# Patient Record
Sex: MALE | Race: WHITE | NOT HISPANIC OR LATINO | URBAN - METROPOLITAN AREA
[De-identification: names, ages, dates, MRNs, and addresses within clinical notes are randomized per-mention and may not be internally consistent; named-entity substitution may affect disease eponyms.]

---

## 2017-05-08 ENCOUNTER — EMERGENCY (EMERGENCY)
Facility: HOSPITAL | Age: 42
LOS: 1 days | End: 2017-05-08
Payer: MEDICARE

## 2017-05-08 PROCEDURE — 99283 EMERGENCY DEPT VISIT LOW MDM: CPT | Mod: 25

## 2017-09-29 ENCOUNTER — EMERGENCY (EMERGENCY)
Facility: HOSPITAL | Age: 42
LOS: 1 days | End: 2017-09-29
Payer: MEDICARE

## 2017-09-29 ENCOUNTER — OUTPATIENT (OUTPATIENT)
Dept: OUTPATIENT SERVICES | Facility: HOSPITAL | Age: 42
LOS: 1 days | End: 2017-09-29

## 2017-09-29 PROCEDURE — 99284 EMERGENCY DEPT VISIT MOD MDM: CPT

## 2018-08-25 ENCOUNTER — EMERGENCY (EMERGENCY)
Facility: HOSPITAL | Age: 43
LOS: 1 days | End: 2018-08-25
Payer: MEDICARE

## 2018-08-25 PROCEDURE — 99283 EMERGENCY DEPT VISIT LOW MDM: CPT

## 2018-09-03 ENCOUNTER — EMERGENCY (EMERGENCY)
Facility: HOSPITAL | Age: 43
LOS: 1 days | End: 2018-09-03
Payer: MEDICARE

## 2018-09-03 PROCEDURE — 99284 EMERGENCY DEPT VISIT MOD MDM: CPT

## 2018-09-03 PROCEDURE — 73660 X-RAY EXAM OF TOE(S): CPT | Mod: 26,RT

## 2018-09-29 ENCOUNTER — EMERGENCY (EMERGENCY)
Facility: HOSPITAL | Age: 43
LOS: 1 days | End: 2018-09-29

## 2019-06-01 ENCOUNTER — OUTPATIENT (OUTPATIENT)
Dept: OUTPATIENT SERVICES | Facility: HOSPITAL | Age: 44
LOS: 1 days | End: 2019-06-01
Payer: MEDICARE

## 2019-06-01 PROCEDURE — G9001: CPT

## 2019-06-16 ENCOUNTER — EMERGENCY (EMERGENCY)
Facility: HOSPITAL | Age: 44
LOS: 1 days | End: 2019-06-16
Admitting: EMERGENCY MEDICINE

## 2019-06-24 DIAGNOSIS — Z71.89 OTHER SPECIFIED COUNSELING: ICD-10-CM

## 2019-07-11 ENCOUNTER — OUTPATIENT (OUTPATIENT)
Dept: OUTPATIENT SERVICES | Facility: HOSPITAL | Age: 44
LOS: 1 days | End: 2019-07-11

## 2019-11-25 ENCOUNTER — EMERGENCY (EMERGENCY)
Facility: HOSPITAL | Age: 44
LOS: 1 days | End: 2019-11-25
Admitting: EMERGENCY MEDICINE
Payer: MEDICARE

## 2019-11-25 PROCEDURE — 73630 X-RAY EXAM OF FOOT: CPT | Mod: 26,LT

## 2019-11-25 PROCEDURE — 99284 EMERGENCY DEPT VISIT MOD MDM: CPT

## 2019-11-28 ENCOUNTER — EMERGENCY (EMERGENCY)
Facility: HOSPITAL | Age: 44
LOS: 1 days | End: 2019-11-28
Admitting: EMERGENCY MEDICINE
Payer: MEDICARE

## 2019-11-28 PROCEDURE — 99283 EMERGENCY DEPT VISIT LOW MDM: CPT

## 2019-12-01 ENCOUNTER — OUTPATIENT (OUTPATIENT)
Dept: OUTPATIENT SERVICES | Facility: HOSPITAL | Age: 44
LOS: 1 days | End: 2019-12-01
Payer: MEDICARE

## 2019-12-01 PROCEDURE — G9001: CPT

## 2019-12-02 ENCOUNTER — EMERGENCY (EMERGENCY)
Facility: HOSPITAL | Age: 44
LOS: 1 days | End: 2019-12-02
Admitting: EMERGENCY MEDICINE
Payer: MEDICARE

## 2019-12-02 PROCEDURE — 99283 EMERGENCY DEPT VISIT LOW MDM: CPT

## 2019-12-15 ENCOUNTER — EMERGENCY (EMERGENCY)
Facility: HOSPITAL | Age: 44
LOS: 1 days | End: 2019-12-15
Admitting: EMERGENCY MEDICINE
Payer: MEDICARE

## 2019-12-15 PROCEDURE — 29515 APPLICATION SHORT LEG SPLINT: CPT

## 2019-12-15 PROCEDURE — 99284 EMERGENCY DEPT VISIT MOD MDM: CPT | Mod: 25

## 2019-12-20 DIAGNOSIS — Z71.89 OTHER SPECIFIED COUNSELING: ICD-10-CM

## 2020-08-27 ENCOUNTER — EMERGENCY (EMERGENCY)
Facility: HOSPITAL | Age: 45
LOS: 1 days | End: 2020-08-27
Admitting: EMERGENCY MEDICINE
Payer: MEDICARE

## 2020-08-27 PROCEDURE — 99284 EMERGENCY DEPT VISIT MOD MDM: CPT

## 2020-08-27 PROCEDURE — 73630 X-RAY EXAM OF FOOT: CPT | Mod: 26,RT

## 2020-08-27 PROCEDURE — 73564 X-RAY EXAM KNEE 4 OR MORE: CPT | Mod: 26,RT

## 2020-08-28 ENCOUNTER — EMERGENCY (EMERGENCY)
Facility: HOSPITAL | Age: 45
LOS: 1 days | End: 2020-08-28
Admitting: EMERGENCY MEDICINE
Payer: MEDICARE

## 2020-08-28 PROCEDURE — 99282 EMERGENCY DEPT VISIT SF MDM: CPT

## 2022-03-28 ENCOUNTER — APPOINTMENT (OUTPATIENT)
Dept: UROLOGY | Facility: CLINIC | Age: 47
End: 2022-03-28
Payer: MEDICARE

## 2022-03-28 VITALS — DIASTOLIC BLOOD PRESSURE: 87 MMHG | HEART RATE: 81 BPM | SYSTOLIC BLOOD PRESSURE: 124 MMHG | TEMPERATURE: 99 F

## 2022-03-28 VITALS — BODY MASS INDEX: 21.97 KG/M2 | WEIGHT: 140 LBS | HEIGHT: 67 IN

## 2022-03-28 DIAGNOSIS — R36.1 HEMATOSPERMIA: ICD-10-CM

## 2022-03-28 DIAGNOSIS — R68.82 DECREASED LIBIDO: ICD-10-CM

## 2022-03-28 DIAGNOSIS — Z78.9 OTHER SPECIFIED HEALTH STATUS: ICD-10-CM

## 2022-03-28 DIAGNOSIS — Z87.448 PERSONAL HISTORY OF OTHER DISEASES OF URINARY SYSTEM: ICD-10-CM

## 2022-03-28 DIAGNOSIS — Z12.5 ENCOUNTER FOR SCREENING FOR MALIGNANT NEOPLASM OF PROSTATE: ICD-10-CM

## 2022-03-28 DIAGNOSIS — N49.0 INFLAMMATORY DISORDERS OF SEMINAL VESICLE: ICD-10-CM

## 2022-03-28 DIAGNOSIS — R39.9 UNSPECIFIED SYMPTOMS AND SIGNS INVOLVING THE GENITOURINARY SYSTEM: ICD-10-CM

## 2022-03-28 PROBLEM — Z00.00 ENCOUNTER FOR PREVENTIVE HEALTH EXAMINATION: Status: ACTIVE | Noted: 2022-03-28

## 2022-03-28 PROCEDURE — 99204 OFFICE O/P NEW MOD 45 MIN: CPT

## 2022-03-28 NOTE — ASSESSMENT
[FreeTextEntry1] : Pt refusing antibiotics\par \par Plan\par UA\par culture\par PSA\par testosterone \par LH\par renal bladder US\par fu 2 weeks

## 2022-03-28 NOTE — PHYSICAL EXAM
[General Appearance - Well Developed] : well developed [General Appearance - Well Nourished] : well nourished [Normal Appearance] : normal appearance [Well Groomed] : well groomed [General Appearance - In No Acute Distress] : no acute distress [Edema] : no peripheral edema [Respiration, Rhythm And Depth] : normal respiratory rhythm and effort [Exaggerated Use Of Accessory Muscles For Inspiration] : no accessory muscle use [Abdomen Soft] : soft [Abdomen Tenderness] : non-tender [Costovertebral Angle Tenderness] : no ~M costovertebral angle tenderness [Normal Station and Gait] : the gait and station were normal for the patient's age [] : no rash [No Focal Deficits] : no focal deficits [Oriented To Time, Place, And Person] : oriented to person, place, and time [Affect] : the affect was normal [Mood] : the mood was normal [Not Anxious] : not anxious [FreeTextEntry1] :  exam refused. (pt would not even sit in the exam room chair bc he didn't want to get germs, so he remained squatting in the room)

## 2022-03-28 NOTE — HISTORY OF PRESENT ILLNESS
[FreeTextEntry1] : Mr. ANTOINE FERNANDEZ 47 year old  M no PMH and no PSH. Pt comes in bc of hematospermia. Pt noticed this this weak. Pt staes last ejaculation happen 2 days ago. Describes color as a light brown color. Pt denies any pain. Pt having some post void dribbling. Pt having frequent urination decreased volume this started within the last 2 months. Urine flow varies. some intermittency. Some hesitancy. Pt denies gross hematuria.  \par

## 2022-04-18 LAB
APPEARANCE: CLEAR
BACTERIA UR CULT: NORMAL
BACTERIA: NEGATIVE
BILIRUBIN URINE: NEGATIVE
BLOOD URINE: NEGATIVE
COLOR: COLORLESS
GLUCOSE QUALITATIVE U: NEGATIVE
HYALINE CASTS: 0 /LPF
KETONES URINE: NEGATIVE
LEUKOCYTE ESTERASE URINE: NEGATIVE
MICROSCOPIC-UA: NORMAL
NITRITE URINE: NEGATIVE
PH URINE: 6.5
PROTEIN URINE: NEGATIVE
RED BLOOD CELLS URINE: 0 /HPF
SPECIFIC GRAVITY URINE: 1.01
SQUAMOUS EPITHELIAL CELLS: 0 /HPF
UROBILINOGEN URINE: NORMAL
WHITE BLOOD CELLS URINE: 0 /HPF

## 2022-06-02 ENCOUNTER — OUTPATIENT (OUTPATIENT)
Dept: OUTPATIENT SERVICES | Facility: HOSPITAL | Age: 47
LOS: 1 days | End: 2022-06-02

## 2022-06-02 DIAGNOSIS — E78.01 FAMILIAL HYPERCHOLESTEROLEMIA: ICD-10-CM

## 2022-06-02 DIAGNOSIS — Z12.5 ENCOUNTER FOR SCREENING FOR MALIGNANT NEOPLASM OF PROSTATE: ICD-10-CM

## 2022-06-02 DIAGNOSIS — R68.82 DECREASED LIBIDO: ICD-10-CM

## 2022-06-02 DIAGNOSIS — L63.9 ALOPECIA AREATA, UNSPECIFIED: ICD-10-CM

## 2022-06-22 ENCOUNTER — APPOINTMENT (OUTPATIENT)
Dept: PODIATRY | Facility: CLINIC | Age: 47
End: 2022-06-22
Payer: MEDICARE

## 2022-06-22 ENCOUNTER — RESULT REVIEW (OUTPATIENT)
Age: 47
End: 2022-06-22

## 2022-06-22 VITALS — WEIGHT: 140 LBS | HEIGHT: 67 IN | BODY MASS INDEX: 21.97 KG/M2

## 2022-06-22 DIAGNOSIS — Q68.8 OTHER SPECIFIED CONGENITAL MUSCULOSKELETAL DEFORMITIES: ICD-10-CM

## 2022-06-22 PROCEDURE — 73630 X-RAY EXAM OF FOOT: CPT | Mod: LT

## 2022-06-22 NOTE — ASSESSMENT
[FreeTextEntry1] : I DISCUSSED USING HEEL LIFT IN FEET TO REDUCE ACHILLES TENDON ISSUE\par STRETCHING, YOGA, REHABILITATING ACHILLES, AND PT.\par I OFFERED STEROID INJECTION BUT HE REFUSED TO HAVE ME APPLY IT TO THE OS TRIGONUM. \par VOLTAREN GEL OR ARNICA TO BE USED.\par

## 2022-06-22 NOTE — PHYSICAL EXAM
[NL 30)] : inversion 30 degrees [NL (40)] : MTP joint DF 40 degrees [NL (20)] : MTP joint PF 20 degrees [5___] : [facility] 5[unfilled]/5 [2+] : posterior tibialis pulse: 2+ [Normal] : saphenous nerve sensation normal [Left] : left foot [There are no fractures, subluxations or dislocations. No significant abnormalities are seen] : There are no fractures, subluxations or dislocations. No significant abnormalities are seen [] : non-antalgic [FreeTextEntry3] : NONE [de-identified] : LARGE OS TRIGONUM NOTED.

## 2022-09-07 ENCOUNTER — APPOINTMENT (OUTPATIENT)
Dept: PODIATRY | Facility: CLINIC | Age: 47
End: 2022-09-07

## 2022-09-07 PROCEDURE — 99214 OFFICE O/P EST MOD 30 MIN: CPT

## 2022-09-07 NOTE — ASSESSMENT
[FreeTextEntry1] : I DISCUSSED THE TREATMENT AND RESULTS OF THE MRI. \par I ALSO DISCUSSED SURGERY\par I OFFERED A STEROID INJECTION BUT HE REFUSED.

## 2022-09-07 NOTE — PHYSICAL EXAM
[NL 30)] : inversion 30 degrees [NL (40)] : MTP joint DF 40 degrees [NL (20)] : MTP joint PF 20 degrees [5___] : [facility] 5[unfilled]/5 [2+] : posterior tibialis pulse: 2+ [Normal] : saphenous nerve sensation normal [] : not mildly antalgic

## 2022-09-07 NOTE — DATA REVIEWED
[MRI] : MRI [Left] : left [Ankle] : ankle [Report was reviewed and noted in the chart] : The report was reviewed and noted in the chart [I reviewed the films/CD and agree] : I reviewed the films/CD and agree [FreeTextEntry1] : IMPRESSION:\par Deformity of the dorsal distal aspect of the talus related to an old healed\par avulsion fracture with associated scarring of the dorsal joint capsule.\par Mild tibialis posterior and flexor digitorum longus tenosynovitis, without\par tears.

## 2022-09-20 ENCOUNTER — APPOINTMENT (OUTPATIENT)
Dept: ORTHOPEDIC SURGERY | Facility: CLINIC | Age: 47
End: 2022-09-20

## 2022-10-04 ENCOUNTER — APPOINTMENT (OUTPATIENT)
Dept: ORTHOPEDIC SURGERY | Facility: CLINIC | Age: 47
End: 2022-10-04

## 2022-10-11 ENCOUNTER — APPOINTMENT (OUTPATIENT)
Dept: ORTHOPEDIC SURGERY | Facility: CLINIC | Age: 47
End: 2022-10-11

## 2022-10-11 VITALS — HEIGHT: 67 IN | BODY MASS INDEX: 21.97 KG/M2 | WEIGHT: 140 LBS

## 2022-10-11 DIAGNOSIS — Z78.9 OTHER SPECIFIED HEALTH STATUS: ICD-10-CM

## 2022-10-11 DIAGNOSIS — S46.312A STRAIN OF MUSCLE, FASCIA AND TENDON OF TRICEPS, LEFT ARM, INITIAL ENCOUNTER: ICD-10-CM

## 2022-10-11 PROCEDURE — 99213 OFFICE O/P EST LOW 20 MIN: CPT

## 2022-10-11 NOTE — IMAGING
[de-identified] :  Left Shoulder Examination:\par \par • Constitutional: \par    - In no acute distress: yes\par    - Alert and oriented (person, place, time): yes \par \par • Inspection and Palpation: \par    - Skin: intact\par    - Swelling: none\par    - Glenohumeral joint line tenderness: negative\par    - Bicipital groove tenderness: negative\par    - AC joint tenderness: negative\par    - Scapulothoracic tenderness: positive over the proximal triceps and posterior deltoid\par    - Cervical spine tenderness: negative\par    - Palpable lymphadenopathy: none \par    - Peripheral edema: none\par \par • Range of Motion (in degrees):\par    - Active forward elevation: 175\par    - Passive forward elevation: 175\par    - External rotation at the side: 80\par    - Internal rotation behind the back: T7 \par 	\par • Stability:\par    - Apprehension sign: negative\par    - Sulcus sign: negative\par \par • Neurovascular: \par    - Atrophy of rotator cuff: absent \par    - Forward elevation strength (out of 5): 5\par    - External rotation strength at the side (out of 5): 5\par    - Internal rotation strength at the side (out of 5): 5\par    - Abduction strength (out of 5): 5\par    - Sensation to light touch: intact in axillary, median, radial and ulnar distributions\par    - Capillary refill: less than 2 seconds in fingers\par \par • Special Tests:\par    - Rascon impingement test: negative \par    - Cross body adduction test: negative \par    - Winthrop's test: negative \par    - Speed’s test: negative\par    - Spurling’s test: negative\par    - Belly press test: normal\par    - Hornblower sign: negative\par    - Neck examination: painless range of motion\par \par • Comments:\par    - None \par \par

## 2022-10-11 NOTE — DATA REVIEWED
[MRI] : MRI [Left] : left [Shoulder] : shoulder [Report was reviewed and noted in the chart] : The report was reviewed and noted in the chart [I reviewed the films/CD and agree] : I reviewed the films/CD and agree [FreeTextEntry1] : supraspinatus tendinosis, superior labral fraying, intact subscap and infra, triceps intact

## 2022-10-11 NOTE — HISTORY OF PRESENT ILLNESS
[de-identified] : The patient presents to clinic for evaluation of the complaint described below.\par \par • Visit Details:\par    - Visit type: new to provider \par    - Worker’s compensation: no\par    - No-fault: no\par \par • Patient Demographics:\par    - Age: 47\par    - Occupation: self employed \par    - Sex: male \par \par • Symptom Details: \par    - Laterality: left\par    - Body part: shoulder\par    - Duration: 1 month\par    - Pain severity (out of 10): 3\par    - Pain timing: constant\par    - Pain quality: tight, dull\par    - Pain interferes with sleep: no\par    - Pain radiates distally: into triceps \par    - Associated with swelling: no\par    - Associated with catching and locking: no\par    - Associated with decreased motion: no\par    - Associated with numbness: no\par    - Worse with activity: yes\par    - Improves with rest: yes\par \par • Treatment Details:\par    - Physical therapy: no\par    - Anti-inflammatory medication: no\par    - Narcotic medication: no\par    - Corticosteroid injection: no\par \par • Comments:\par    - Reports posterior shoulder pain in his triceps region. Worse with overhead activity. He has been doing a home exercise program over the past few months attempting to improve his scapular motion. \par \par \par IMPRESSION:\par Supraspinatus tendinosis with fraying of the articular surface. Mild\par infraspinatus and subscapularis tendinosis.\par \par Nondisplaced tear of the superior glenoid labrum.\par \par Thickening of the joint capsule at the level of the axillary recess. This\par finding may be secondary to a nonacute injury or the presence of adhesive\par capsulitis.\par \par

## 2022-10-11 NOTE — DISCUSSION/SUMMARY
[de-identified] : Assessment\par \par The patient presents with history, examination and imaging that are most consistent with a diagnosis of:\par left posterior shoulder and arm pain consistent with triceps tendonitis \par \par Low concern for symptomatic labral injury. He has preserved strength of his rotator cuff. \par \par The patient would like to pursue conservative measures at this time. After consideration of various non-operative treatment modalities, the patient would like to proceed with the modalities listed below.\par \par The patient is in agreement with the treatment plan and all questions were answered. \par \par ..........\par \par Plan\par \par - Counseling: Patient recommended to modify their activities until symptoms resolve. \par - Home Exercise Program: Patient to continue with self directed exercises. Formal PT is offered but deferred.\par - Ibuprofen: Patient will obtain medication over-the-counter. Patient was instructed to take this medication with food on an as needed basis. Patient educated on the gastrointestinal side effects with excessive use. \par - Follow-up: as needed\par \par ..........\par \par Medical Decision Making\par \par • Problem:\par    - Chronic illness with exacerbation - moderate\par • Data:\par    - Review of available external records - low\par • Risk:\par    - Over the counter medication - low\par • MDM Level:\par    - Level 3\par

## 2022-12-01 ENCOUNTER — APPOINTMENT (OUTPATIENT)
Dept: ORTHOPEDIC SURGERY | Facility: CLINIC | Age: 47
End: 2022-12-01

## 2022-12-01 DIAGNOSIS — S83.241A OTHER TEAR OF MEDIAL MENISCUS, CURRENT INJURY, RIGHT KNEE, INITIAL ENCOUNTER: ICD-10-CM

## 2022-12-01 DIAGNOSIS — S83.511A SPRAIN OF ANTERIOR CRUCIATE LIGAMENT OF RIGHT KNEE, INITIAL ENCOUNTER: ICD-10-CM

## 2022-12-01 DIAGNOSIS — S83.281A OTHER TEAR OF LATERAL MENISCUS, CURRENT INJURY, RIGHT KNEE, INITIAL ENCOUNTER: ICD-10-CM

## 2022-12-01 PROCEDURE — 99215 OFFICE O/P EST HI 40 MIN: CPT

## 2022-12-05 NOTE — PHYSICAL EXAM
[de-identified] : Constitutional: The patient appears well developed, well nourished. Examination of patients ability to communicate functionally was normal. \par \par Neurologic: Coordination is normal. Alert and oriented to time, place and person. No evidence of mood disorder, calm affect. \par \par    RIGHT   KNEE: Inspection of the knee is as follows: mild effusion. no ecchymosis, no streaking, no erythema, no atrophy, no deformities of the quad tendon and no deformities of patellar tendon. \par \par Palpation of the knee is as follows: medial joint line tenderness. no obvious defects, no palpable masses, no increased warmth and no crepitus. \par \par Knee Range of Motion is as follows in degrees:\par  \par Extension: 3 \par Flexion: 120\par \par Strength examination of the knee is as follows: \par \par Quadriceps strength is 5/5\par Hamstring strength is 5/5 \par \par Ligament Stability and Special Test:  positive McMurrays test. ligamentously stable, negative anterior draw, negative Lachman test, negative posterior draw and no varus or valgus instability. patella tracks well and able to do active straight leg raise without an extensor lag.  NO ANTERIOR DRAWER NOTED\par \par Neurological examination of the knee is as follows: light touch is intact throughout. \par \par Gait and function is as follows: non-antalgic gait. \par

## 2022-12-05 NOTE — DISCUSSION/SUMMARY
[de-identified] : Options were discussed. recommend Knee scope partial med/lateral meniscectomy  and not to reconstruct the ACL . He does not exhibit any instability. He is lacking a few degrees of extension\par The Risks, benefits, alternatives and expectations of the proposed procedure, as well as non-operative management, were discussed at length with the patient, as well as the procedure itself and the expected recovery period. The possible need for post operative Physical Therapy was also discussed. The patient was allowed as much tome as necessary to ask all appropriate questions and they were answered to the patient's satisfaction\par Risks involving the knee arthroscopy were discussed with the patient and include infection, bleeding, anesthesia complications, NV injury, DVT.\par \par \par He wants to consider his options and will follow up PRN. \par \par \par

## 2022-12-05 NOTE — REASON FOR VISIT
[FreeTextEntry2] : MRI impressions R knee: 1. Complete tear of anterior cruciate ligament.\par 2. Subchondral fracture in the anterior weightbearing surface of the lateral\par femoral condyle and posterior medial and lateral tibial plateau with surrounding\par bone marrow edema.\par 3. Peripheral oblique tear in the posterior horn of the medial meniscus.\par 4. Oblique tear in the posterior horn of the lateral meniscus.\par 5. Large knee joint effusion.

## 2022-12-05 NOTE — HISTORY OF PRESENT ILLNESS
[de-identified] : Patient is here today for the right knee. Patient had x-rays at [facility] and an MRI @ .  Patient fell approx 15 feet from a loft 1 week ago.  He notes pain with flexion of the knee and pushing off.  He is unsure if the knee bell.  He feels he may have had an ACL deficiency prior to this injury.  He states his Left knee has prior issues as well.

## 2023-02-01 ENCOUNTER — APPOINTMENT (OUTPATIENT)
Dept: PODIATRY | Facility: CLINIC | Age: 48
End: 2023-02-01
Payer: MEDICARE

## 2023-02-01 DIAGNOSIS — S90.02XA CONTUSION OF LEFT ANKLE, INITIAL ENCOUNTER: ICD-10-CM

## 2023-02-01 DIAGNOSIS — M79.672 PAIN IN LEFT FOOT: ICD-10-CM

## 2023-02-01 DIAGNOSIS — M62.9 DISORDER OF MUSCLE, UNSPECIFIED: ICD-10-CM

## 2023-02-01 PROCEDURE — 99213 OFFICE O/P EST LOW 20 MIN: CPT

## 2023-02-07 NOTE — HISTORY OF PRESENT ILLNESS
[de-identified] : Patient presents today for evaluation of Left foot/ankle pain. Patient states he was seen for the same problem on 9/7/22. Patient states that the problem is worsening somewhat since last September.

## 2023-02-07 NOTE — REASON FOR VISIT
[FreeTextEntry2] : LT foot/ankle pain.  FELL OFF TREE FROM HEIGHT.  NOW ANKLES HURT SOMETIMES.  BRUISING ON BOTTOM OF FOOT ALSO.

## 2023-02-07 NOTE — ASSESSMENT
[FreeTextEntry1] : I DISCUSSED TREATMENT OPTIONS AS FOLLOWS:  I DISCUSSED FINDINGS WITH MY PATIENT AND DISCUSSED FURTHER TREATMENT IF NEEDED. \par THE FOLLOWING WAS SUGGESTED:\par NSAID OF CHOICE.\par TYLENOL.\par VOLTAREN GEL APPLY 4 TIMES PER DAY TO AFFECTED AREA. \par WARM OR COLD COMPRESSES AS TOLERATED.\par PT. WAS DISCUSSED AS WELL AS HOME EXERCISE PROGRAMS.\par ELEVATE AND APPLY WARM COMPRESSES.\par OTC INSERTS TO BE WORN FROM POWER STEP OR LIKE INSERTS. .\par STEROID INJECTION DISCUSSED\par RX MOBIC 15MG # 30 T PO QD FOR PAIN.

## 2023-02-07 NOTE — PHYSICAL EXAM
[5___] : [facility] 5[unfilled]/5 [2+] : posterior tibialis pulse: 2+ [Normal] : saphenous nerve sensation normal [Left] : left foot and ankle [NL 30)] : inversion 30 degrees [NL (40)] : MTP joint DF 40 degrees [NL (20)] : MTP joint PF 20 degrees [] : no generalized ligamentous laxity [FreeTextEntry3] : PLANTAR ARCH EDEMA AND ECCHYMOSIS \par ACHILLES PAIN AT INSERTION

## 2023-02-23 ENCOUNTER — APPOINTMENT (OUTPATIENT)
Dept: ORTHOPEDIC SURGERY | Facility: CLINIC | Age: 48
End: 2023-02-23

## 2023-04-06 ENCOUNTER — APPOINTMENT (OUTPATIENT)
Dept: CARDIOLOGY | Facility: CLINIC | Age: 48
End: 2023-04-06

## 2023-05-23 ENCOUNTER — APPOINTMENT (OUTPATIENT)
Dept: CARDIOLOGY | Facility: CLINIC | Age: 48
End: 2023-05-23
Payer: MEDICARE

## 2023-05-23 ENCOUNTER — NON-APPOINTMENT (OUTPATIENT)
Age: 48
End: 2023-05-23

## 2023-05-23 VITALS
OXYGEN SATURATION: 98 % | DIASTOLIC BLOOD PRESSURE: 70 MMHG | WEIGHT: 140 LBS | HEART RATE: 65 BPM | BODY MASS INDEX: 21.97 KG/M2 | SYSTOLIC BLOOD PRESSURE: 124 MMHG | HEIGHT: 67 IN

## 2023-05-23 VITALS — DIASTOLIC BLOOD PRESSURE: 70 MMHG | SYSTOLIC BLOOD PRESSURE: 122 MMHG

## 2023-05-23 DIAGNOSIS — Z13.6 ENCOUNTER FOR SCREENING FOR CARDIOVASCULAR DISORDERS: ICD-10-CM

## 2023-05-23 DIAGNOSIS — Z78.9 OTHER SPECIFIED HEALTH STATUS: ICD-10-CM

## 2023-05-23 PROCEDURE — 93000 ELECTROCARDIOGRAM COMPLETE: CPT

## 2023-05-23 PROCEDURE — 99204 OFFICE O/P NEW MOD 45 MIN: CPT

## 2023-05-23 RX ORDER — MELOXICAM 15 MG/1
15 TABLET ORAL
Qty: 30 | Refills: 2 | Status: DISCONTINUED | COMMUNITY
Start: 2023-02-01 | End: 2023-05-23

## 2023-05-23 NOTE — PHYSICAL EXAM
[Normal] : no edema, no cyanosis, no clubbing, no varicosities [de-identified] : No carotid bruits auscultated bilaterally

## 2023-05-23 NOTE — DISCUSSION/SUMMARY
[FreeTextEntry1] : 1. Dyspnea: recommend echocardiogram\par \par 2. HLD: ordered lipid panel and CT of heart for calcium scoring.\par \par Follow up after testing. [EKG obtained to assist in diagnosis and management of assessed problem(s)] : EKG obtained to assist in diagnosis and management of assessed problem(s)

## 2023-05-23 NOTE — HISTORY OF PRESENT ILLNESS
[FreeTextEntry1] : 48 year old male with PMHx of HLD presents for a cardiac evaluation. Dyspnea times with exertion. Some fatigue.\par No chest pain or pressure.\par \par There is no history of MI, CVA, CHF, or previous coronary intervention.\par

## 2023-06-01 ENCOUNTER — APPOINTMENT (OUTPATIENT)
Dept: CARDIOLOGY | Facility: CLINIC | Age: 48
End: 2023-06-01

## 2023-06-09 ENCOUNTER — APPOINTMENT (OUTPATIENT)
Dept: CARDIOLOGY | Facility: CLINIC | Age: 48
End: 2023-06-09

## 2023-06-16 ENCOUNTER — APPOINTMENT (OUTPATIENT)
Dept: CARDIOLOGY | Facility: CLINIC | Age: 48
End: 2023-06-16
Payer: MEDICARE

## 2023-06-16 PROCEDURE — 93306 TTE W/DOPPLER COMPLETE: CPT

## 2023-06-17 ENCOUNTER — TRANSCRIPTION ENCOUNTER (OUTPATIENT)
Age: 48
End: 2023-06-17

## 2023-07-03 ENCOUNTER — NON-APPOINTMENT (OUTPATIENT)
Age: 48
End: 2023-07-03

## 2023-07-12 ENCOUNTER — APPOINTMENT (OUTPATIENT)
Dept: ULTRASOUND IMAGING | Facility: CLINIC | Age: 48
End: 2023-07-12
Payer: MEDICARE

## 2023-07-12 PROCEDURE — 76700 US EXAM ABDOM COMPLETE: CPT

## 2023-11-16 ENCOUNTER — APPOINTMENT (OUTPATIENT)
Dept: FAMILY MEDICINE | Facility: CLINIC | Age: 48
End: 2023-11-16
Payer: MEDICARE

## 2023-11-16 ENCOUNTER — NON-APPOINTMENT (OUTPATIENT)
Age: 48
End: 2023-11-16

## 2023-11-16 VITALS
HEIGHT: 67 IN | TEMPERATURE: 97.7 F | WEIGHT: 140 LBS | BODY MASS INDEX: 21.97 KG/M2 | DIASTOLIC BLOOD PRESSURE: 80 MMHG | HEART RATE: 89 BPM | SYSTOLIC BLOOD PRESSURE: 120 MMHG | OXYGEN SATURATION: 98 %

## 2023-11-16 DIAGNOSIS — R06.00 DYSPNEA, UNSPECIFIED: ICD-10-CM

## 2023-11-16 DIAGNOSIS — Z13.228 ENCOUNTER FOR SCREENING FOR OTHER SUSPECTED ENDOCRINE DISORDER: ICD-10-CM

## 2023-11-16 DIAGNOSIS — Z13.0 ENCOUNTER FOR SCREENING FOR OTHER SUSPECTED ENDOCRINE DISORDER: ICD-10-CM

## 2023-11-16 DIAGNOSIS — Z13.29 ENCOUNTER FOR SCREENING FOR OTHER SUSPECTED ENDOCRINE DISORDER: ICD-10-CM

## 2023-11-16 DIAGNOSIS — Z13.21 ENCOUNTER FOR SCREENING FOR OTHER SUSPECTED ENDOCRINE DISORDER: ICD-10-CM

## 2023-11-16 PROCEDURE — 99214 OFFICE O/P EST MOD 30 MIN: CPT | Mod: 25

## 2023-11-16 PROCEDURE — 36415 COLL VENOUS BLD VENIPUNCTURE: CPT

## 2023-11-17 ENCOUNTER — TRANSCRIPTION ENCOUNTER (OUTPATIENT)
Age: 48
End: 2023-11-17

## 2023-12-06 ENCOUNTER — APPOINTMENT (OUTPATIENT)
Dept: INFECTIOUS DISEASE | Facility: CLINIC | Age: 48
End: 2023-12-06

## 2023-12-20 ENCOUNTER — APPOINTMENT (OUTPATIENT)
Dept: FAMILY MEDICINE | Facility: CLINIC | Age: 48
End: 2023-12-20
Payer: MEDICARE

## 2023-12-20 ENCOUNTER — APPOINTMENT (OUTPATIENT)
Dept: ORTHOPEDIC SURGERY | Facility: CLINIC | Age: 48
End: 2023-12-20

## 2023-12-20 VITALS
DIASTOLIC BLOOD PRESSURE: 80 MMHG | OXYGEN SATURATION: 98 % | BODY MASS INDEX: 20.72 KG/M2 | WEIGHT: 132 LBS | HEIGHT: 67 IN | RESPIRATION RATE: 17 BRPM | SYSTOLIC BLOOD PRESSURE: 130 MMHG | TEMPERATURE: 97 F | HEART RATE: 63 BPM

## 2023-12-20 DIAGNOSIS — M70.52 OTHER BURSITIS OF KNEE, LEFT KNEE: ICD-10-CM

## 2023-12-20 DIAGNOSIS — M13.0 POLYARTHRITIS, UNSPECIFIED: ICD-10-CM

## 2023-12-20 DIAGNOSIS — M76.62 ACHILLES TENDINITIS, LEFT LEG: ICD-10-CM

## 2023-12-20 DIAGNOSIS — E21.3 HYPERPARATHYROIDISM, UNSPECIFIED: ICD-10-CM

## 2023-12-20 DIAGNOSIS — E78.00 PURE HYPERCHOLESTEROLEMIA, UNSPECIFIED: ICD-10-CM

## 2023-12-20 LAB
25(OH)D3 SERPL-MCNC: 30.6 NG/ML
ANAPLASMA PHAGOCYTOPHILIA IGG ANTIBODIES: NEGATIVE
ANAPLASMA PHAGOCYTOPHILIA IGM ANTIBODIES: NEGATIVE
ANION GAP SERPL CALC-SCNC: 16 MMOL/L
BABESIA ANTIBODIES, IGG: NORMAL
BABESIA ANTIBODIES, IGM: NORMAL
BABESIA RESULT COMMENT: NORMAL
BASOPHILS # BLD AUTO: 0.08 K/UL
BASOPHILS NFR BLD AUTO: 0.8 %
BUN SERPL-MCNC: 21 MG/DL
CALCIUM SERPL-MCNC: 10 MG/DL
CALCIUM SERPL-MCNC: 10 MG/DL
CHLORIDE SERPL-SCNC: 98 MMOL/L
CHOLEST SERPL-MCNC: 468 MG/DL
CO2 SERPL-SCNC: 24 MMOL/L
CREAT SERPL-MCNC: 1.32 MG/DL
EGFR: 67 ML/MIN/1.73M2
EHRLICIA CHAFFEENIS IGG ANTIBODIES: NEGATIVE
EHRLICIA CHAFFEENIS IGM ANTIBODIES: NEGATIVE
EOSINOPHIL # BLD AUTO: 0.28 K/UL
EOSINOPHIL NFR BLD AUTO: 2.8 %
ERHLICIA RESULT COMMENT: NORMAL
GLUCOSE SERPL-MCNC: 102 MG/DL
HCT VFR BLD CALC: 43.3 %
HDLC SERPL-MCNC: 112 MG/DL
HGB BLD-MCNC: 14.7 G/DL
IMM GRANULOCYTES NFR BLD AUTO: 0.2 %
LDLC SERPL CALC-MCNC: 347 MG/DL
LYMPHOCYTES # BLD AUTO: 1.33 K/UL
LYMPHOCYTES NFR BLD AUTO: 13.1 %
MAN DIFF?: NORMAL
MCHC RBC-ENTMCNC: 31.6 PG
MCHC RBC-ENTMCNC: 33.9 GM/DL
MCV RBC AUTO: 93.1 FL
MONOCYTES # BLD AUTO: 0.78 K/UL
MONOCYTES NFR BLD AUTO: 7.7 %
NEUTROPHILS # BLD AUTO: 7.63 K/UL
NEUTROPHILS NFR BLD AUTO: 75.4 %
NONHDLC SERPL-MCNC: 355 MG/DL
PARATHYROID HORMONE INTACT: 104 PG/ML
PHOSPHATE SERPL-MCNC: 4.3 MG/DL
PLATELET # BLD AUTO: 271 K/UL
POTASSIUM SERPL-SCNC: 4 MMOL/L
RBC # BLD: 4.65 M/UL
RBC # FLD: 12 %
SODIUM SERPL-SCNC: 139 MMOL/L
TRIGL SERPL-MCNC: 77 MG/DL
WBC # FLD AUTO: 10.12 K/UL

## 2023-12-20 PROCEDURE — 36415 COLL VENOUS BLD VENIPUNCTURE: CPT

## 2023-12-20 PROCEDURE — 99214 OFFICE O/P EST MOD 30 MIN: CPT | Mod: 25

## 2023-12-20 NOTE — PLAN
[FreeTextEntry1] : 48-year-old gentleman presents for evaluation Migratory joint pain-concerned about carcinomatosis polyarthritis.  He has been to a plethora of specialists without reconciliation Endocrinology/immunology/hematology/rheumatology Not getting any answers he continues to complain of problems RASTs are drawn for evaluation Weight loss-describes 15 pound weight loss over the past few months with loss of muscle tone

## 2023-12-20 NOTE — HISTORY OF PRESENT ILLNESS
[FreeTextEntry1] : Migratory joint pain [de-identified] : Multiple issues are discussed for overall wellbeing

## 2024-02-23 ENCOUNTER — APPOINTMENT (OUTPATIENT)
Dept: ORTHOPEDIC SURGERY | Facility: CLINIC | Age: 49
End: 2024-02-23

## 2024-11-29 ENCOUNTER — APPOINTMENT (OUTPATIENT)
Dept: FAMILY MEDICINE | Facility: CLINIC | Age: 49
End: 2024-11-29

## 2025-01-18 ENCOUNTER — NON-APPOINTMENT (OUTPATIENT)
Age: 50
End: 2025-01-18

## 2025-07-16 ENCOUNTER — APPOINTMENT (OUTPATIENT)
Dept: ORTHOPEDIC SURGERY | Facility: CLINIC | Age: 50
End: 2025-07-16

## 2025-07-16 ENCOUNTER — APPOINTMENT (OUTPATIENT)
Dept: CARDIOLOGY | Facility: CLINIC | Age: 50
End: 2025-07-16